# Patient Record
Sex: FEMALE | Race: BLACK OR AFRICAN AMERICAN | NOT HISPANIC OR LATINO | Employment: FULL TIME | URBAN - METROPOLITAN AREA
[De-identification: names, ages, dates, MRNs, and addresses within clinical notes are randomized per-mention and may not be internally consistent; named-entity substitution may affect disease eponyms.]

---

## 2017-09-25 ENCOUNTER — OFFICE VISIT (OUTPATIENT)
Dept: ENDOCRINOLOGY | Facility: CLINIC | Age: 37
End: 2017-09-25
Payer: COMMERCIAL

## 2017-09-25 ENCOUNTER — LAB VISIT (OUTPATIENT)
Dept: LAB | Facility: HOSPITAL | Age: 37
End: 2017-09-25
Attending: INTERNAL MEDICINE
Payer: COMMERCIAL

## 2017-09-25 VITALS
SYSTOLIC BLOOD PRESSURE: 130 MMHG | HEIGHT: 67 IN | TEMPERATURE: 97 F | WEIGHT: 212.5 LBS | DIASTOLIC BLOOD PRESSURE: 80 MMHG | HEART RATE: 76 BPM | BODY MASS INDEX: 33.35 KG/M2

## 2017-09-25 DIAGNOSIS — E05.90 HYPERTHYROIDISM: Primary | ICD-10-CM

## 2017-09-25 DIAGNOSIS — E04.2 MULTIPLE THYROID NODULES: ICD-10-CM

## 2017-09-25 DIAGNOSIS — E88.819 INSULIN RESISTANCE: ICD-10-CM

## 2017-09-25 DIAGNOSIS — E05.90 HYPERTHYROIDISM: ICD-10-CM

## 2017-09-25 LAB — HCG INTACT+B SERPL-ACNC: <1.2 MIU/ML

## 2017-09-25 PROCEDURE — 3008F BODY MASS INDEX DOCD: CPT | Mod: S$GLB,,, | Performed by: INTERNAL MEDICINE

## 2017-09-25 PROCEDURE — 36415 COLL VENOUS BLD VENIPUNCTURE: CPT | Mod: PO

## 2017-09-25 PROCEDURE — 99999 PR PBB SHADOW E&M-NEW PATIENT-LVL III: CPT | Mod: PBBFAC,,, | Performed by: INTERNAL MEDICINE

## 2017-09-25 PROCEDURE — 99214 OFFICE O/P EST MOD 30 MIN: CPT | Mod: S$GLB,,, | Performed by: INTERNAL MEDICINE

## 2017-09-25 PROCEDURE — 84702 CHORIONIC GONADOTROPIN TEST: CPT

## 2017-09-25 RX ORDER — METFORMIN HYDROCHLORIDE 500 MG/1
TABLET, EXTENDED RELEASE ORAL
Qty: 60 TABLET | Refills: 3 | Status: SHIPPED | OUTPATIENT
Start: 2017-09-25 | End: 2023-08-28

## 2017-09-25 RX ORDER — HYDROCHLOROTHIAZIDE 12.5 MG/1
12.5 CAPSULE ORAL DAILY
Refills: 5 | COMMUNITY
Start: 2017-08-12

## 2017-09-25 RX ORDER — AMLODIPINE BESYLATE 5 MG/1
5 TABLET ORAL
COMMUNITY
Start: 2015-09-28

## 2017-09-25 RX ORDER — NYSTATIN 100000 [USP'U]/G
POWDER TOPICAL 2 TIMES DAILY
Refills: 5 | COMMUNITY
Start: 2017-08-10 | End: 2023-08-28

## 2017-09-25 NOTE — PROGRESS NOTES
""This note will be shared with the patient" Patient ID: Nathaly Sena is a 37 y.o. female.  Patient is here for follow up-last seen 11/2015        Chief Complaint: Hyperthyroidism      HPI  Nathaly Sena is a 35 y.o. female who  Was originally referred by her PCP Dr. Demetria Nunez for history of subclinical hyperthyroidism in 2011. She was referred for having had a suppressed TSH with normal T4 and T3 levels and she had an uptake and scan at that time that suggested a warm nodule on the left side. Her thyroid ultrasound showed a possible correlating thyroid nodule on the left side. Her thyroid ultrasound showed a 11 mm nodule in the mid to lower portion of the left lobe. She had a smaller 8 mm nodule in the lower pole as well. Because she was asymptomatic and had mild hyperthyroidism with smaller nodules and her young age I wanted to have her follow-up and if it persisted the plan was going to treat with radioactive iodine however the patient apparently did not follow-up again until 2015 and again she had suppressed TSH, I repeated thyroid ultrasound and this time the 2 nodules were subcentimeter but thyroid uptake and scan again suggested a hot nodule , I had planned treatment with radioactive iodine but due to cost patient was not able to get treatment and she did not follow-up with anyone until recently and she saw her gynecologist with complaints of fatigue     She showed me results of labs on her phone and her TSH is again suppressed at 0.187 with a normal free T4.  She also had insulin resistance revealed on glucose tolerance test as her insulin levels went up high.  The only blood sugar elevation was in 1 hour the blood sugar was 165     She admits she does not follow particularly healthy diet    there is no family history of thyroid disease or thyroid cancer and no exposure to radiation        I have reviewed the past medical, family and social history    Review of Systems   Constitutional: Positive for " fatigue. Negative for appetite change, fever and unexpected weight change.   HENT: Negative for sore throat and trouble swallowing.    Eyes: Negative for visual disturbance.   Respiratory: Negative for shortness of breath and wheezing.    Cardiovascular: Positive for palpitations. Negative for chest pain and leg swelling.   Gastrointestinal: Negative for diarrhea, nausea and vomiting.   Endocrine: Negative for cold intolerance, heat intolerance, polydipsia, polyphagia and polyuria.        Feeling hot flashes   Genitourinary: Negative for difficulty urinating, dysuria and menstrual problem.   Musculoskeletal: Negative for arthralgias and joint swelling.   Skin: Negative for rash.   Neurological: Negative for dizziness, weakness, numbness and headaches.   Psychiatric/Behavioral: Positive for sleep disturbance. Negative for confusion and dysphoric mood. The patient is nervous/anxious.        Objective:      Physical Exam   Constitutional: She is oriented to person, place, and time. She appears well-developed and well-nourished. No distress.   HENT:   Head: Normocephalic and atraumatic.   Right Ear: External ear normal.   Left Ear: External ear normal.   Nose: Nose normal.   Mouth/Throat: Oropharynx is clear and moist. No oropharyngeal exudate.   Eyes: Conjunctivae and EOM are normal. Pupils are equal, round, and reactive to light. No scleral icterus.   Neck: No JVD present. No tracheal deviation present. No thyromegaly present.   Cardiovascular: Normal rate, regular rhythm, normal heart sounds and intact distal pulses.  Exam reveals no gallop and no friction rub.    No murmur heard.  Pulmonary/Chest: Effort normal and breath sounds normal. No respiratory distress. She has no wheezes. She has no rales.   Abdominal: Soft. Bowel sounds are normal. She exhibits no distension and no mass. There is no tenderness. There is no rebound and no guarding. No hernia.   Musculoskeletal: She exhibits no edema or deformity.  "  Lymphadenopathy:     She has no cervical adenopathy.   Neurological: She is alert and oriented to person, place, and time. She has normal reflexes. No cranial nerve deficit.   Skin: Skin is warm. No rash noted. No erythema.   Psychiatric: She has a normal mood and affect. Her behavior is normal.   Vitals reviewed.        Lab Review:   As above  THYROID SCAN 6 and 24 hour radioiodine uptake    INDICATION:  Hyperthyroidism, toxic thyroid nodule    TECHNIQUE:  After administration of oral I-123,  6 and 24 hour uptake values were obtained of the thyroid gland followed by 24-hour images with a gamma camera using a pinhole collimator.    COMPARISON: Thyroid ultrasound 12/1/2015    FINDINGS:  Images demonstrate a small focus of increased radio iodine uptake in the lower pole of the left lobe of the thyroid gland. The 6 hour uptake is 13%.  The 24 hour radioiodine uptake is 28.1%.         IMPRESSION:  1. Normal 6- and 24-hour radioiodine uptake of 13% and 28%, respectively. (Normal range is 6- 18% after 6 hours and 10 to 35% at 24 hours.)    2. Small focus of increased uptake in the lower pole of the left lobe of the thyroid gland compatible with a hyperfunctioning nodule ("hot" nodule). This appears to correspond to a complex nodule seen on the recent thyroid ultrasound. However, it may be   related to an adjacent solid nodule.          Reference: Society of Nuclear Medicine Procedure Guideline for Thyroid Uptake Measurement.  Version 3.0, approved September 5, 2006.                                          Transcribed By:  BYRON  Transcribed Date/Time:  02-DEC-2015 09:01  Assessment:     1. Hyperthyroidism  HCG, QUANTITATIVE, PREGNANCY    Persistent long-standing hyperthyroidism due to hot nodule based on 2 previous thyroid uptake scan. Plan I-131 rx with 10 Micu   2. Multiple thyroid nodules  US Soft Tissue Head Neck Thyroid    Do follow-up thyroid ultrasound   3. Insulin resistance      Increase physical activity, reduce " carbohydrate intake and start metformin        I discussed a possible alternative treatment with methimazole but patient prefer radioactive iodine treatment    Radioactive iodine precautions explained to patient  Plan:   Hyperthyroidism  Comments:  Persistent long-standing hyperthyroidism due to hot nodule based on 2 previous thyroid uptake scan. Plan I-131 rx with 10 Micu  Orders:  -     HCG, QUANTITATIVE, PREGNANCY; Future; Expected date: 09/25/2017    Multiple thyroid nodules  Comments:  Do follow-up thyroid ultrasound  Orders:  -     US Soft Tissue Head Neck Thyroid; Future; Expected date: 09/25/2017    Insulin resistance  Comments:  Increase physical activity, reduce carbohydrate intake and start metformin    Other orders  -     metformin (GLUCOPHAGE-XR) 500 MG 24 hr tablet; Start with one tablet with dinner for one week;2nd week: Add 2nd tablet with dinner.  Dispense: 60 tablet; Refill: 3          Return in about 2 months (around 11/25/2017).    Labs prior to appointment? not applicable

## 2017-09-29 ENCOUNTER — TELEPHONE (OUTPATIENT)
Dept: RADIOLOGY | Facility: HOSPITAL | Age: 37
End: 2017-09-29

## 2017-10-02 ENCOUNTER — HOSPITAL ENCOUNTER (OUTPATIENT)
Dept: RADIOLOGY | Facility: HOSPITAL | Age: 37
Discharge: HOME | End: 2017-10-02
Attending: INTERNAL MEDICINE
Payer: COMMERCIAL

## 2017-10-02 DIAGNOSIS — E04.2 MULTIPLE THYROID NODULES: ICD-10-CM

## 2017-10-02 PROCEDURE — 76536 US EXAM OF HEAD AND NECK: CPT | Mod: TC,PO

## 2017-10-02 PROCEDURE — 76536 US EXAM OF HEAD AND NECK: CPT | Mod: 26,,, | Performed by: RADIOLOGY

## 2017-10-04 ENCOUNTER — TELEPHONE (OUTPATIENT)
Dept: ENDOCRINOLOGY | Facility: CLINIC | Age: 37
End: 2017-10-04

## 2018-04-02 NOTE — TELEPHONE ENCOUNTER
Pt advised that she was overdue for a MD visit. Pt scheduled an appt, and stated that she has enough medication to last until appt. Pt advised that no additional refills will be given until she is seen by Md. Pt verbalized understanding.

## 2018-05-01 ENCOUNTER — LAB VISIT (OUTPATIENT)
Dept: LAB | Facility: HOSPITAL | Age: 38
End: 2018-05-01
Attending: INTERNAL MEDICINE
Payer: COMMERCIAL

## 2018-05-01 ENCOUNTER — OFFICE VISIT (OUTPATIENT)
Dept: ENDOCRINOLOGY | Facility: CLINIC | Age: 38
End: 2018-05-01
Payer: COMMERCIAL

## 2018-05-01 VITALS
TEMPERATURE: 98 F | WEIGHT: 205.5 LBS | BODY MASS INDEX: 32.25 KG/M2 | DIASTOLIC BLOOD PRESSURE: 84 MMHG | HEART RATE: 71 BPM | SYSTOLIC BLOOD PRESSURE: 126 MMHG | HEIGHT: 67 IN

## 2018-05-01 DIAGNOSIS — E88.819 INSULIN RESISTANCE: ICD-10-CM

## 2018-05-01 DIAGNOSIS — E04.2 MULTIPLE THYROID NODULES: ICD-10-CM

## 2018-05-01 DIAGNOSIS — E05.90 HYPERTHYROIDISM: Primary | ICD-10-CM

## 2018-05-01 DIAGNOSIS — E05.90 HYPERTHYROIDISM: ICD-10-CM

## 2018-05-01 LAB
ALBUMIN SERPL BCP-MCNC: 4 G/DL
ALP SERPL-CCNC: 71 U/L
ALT SERPL W/O P-5'-P-CCNC: 31 U/L
ANION GAP SERPL CALC-SCNC: 9 MMOL/L
AST SERPL-CCNC: 24 U/L
BILIRUB SERPL-MCNC: 0.5 MG/DL
BUN SERPL-MCNC: 13 MG/DL
CALCIUM SERPL-MCNC: 9.7 MG/DL
CHLORIDE SERPL-SCNC: 107 MMOL/L
CO2 SERPL-SCNC: 27 MMOL/L
CREAT SERPL-MCNC: 0.7 MG/DL
EST. GFR  (AFRICAN AMERICAN): >60 ML/MIN/1.73 M^2
EST. GFR  (NON AFRICAN AMERICAN): >60 ML/MIN/1.73 M^2
ESTIMATED AVG GLUCOSE: 88 MG/DL
GLUCOSE SERPL-MCNC: 74 MG/DL
HBA1C MFR BLD HPLC: 4.7 %
INSULIN COLLECTION INTERVAL: NORMAL
INSULIN SERPL-ACNC: 12.8 UU/ML
POTASSIUM SERPL-SCNC: 4 MMOL/L
PROT SERPL-MCNC: 7.7 G/DL
SODIUM SERPL-SCNC: 143 MMOL/L
T3FREE SERPL-MCNC: 2.5 PG/ML
T4 FREE SERPL-MCNC: 0.81 NG/DL
THYROGLOB AB SERPL IA-ACNC: <4 IU/ML
THYROPEROXIDASE IGG SERPL-ACNC: <6 IU/ML
TSH SERPL DL<=0.005 MIU/L-ACNC: 0.41 UIU/ML

## 2018-05-01 PROCEDURE — 99214 OFFICE O/P EST MOD 30 MIN: CPT | Mod: S$GLB,,, | Performed by: INTERNAL MEDICINE

## 2018-05-01 PROCEDURE — 99999 PR PBB SHADOW E&M-EST. PATIENT-LVL III: CPT | Mod: PBBFAC,,, | Performed by: INTERNAL MEDICINE

## 2018-05-01 PROCEDURE — 84481 FREE ASSAY (FT-3): CPT

## 2018-05-01 PROCEDURE — 86376 MICROSOMAL ANTIBODY EACH: CPT

## 2018-05-01 PROCEDURE — 84432 ASSAY OF THYROGLOBULIN: CPT

## 2018-05-01 PROCEDURE — 84443 ASSAY THYROID STIM HORMONE: CPT

## 2018-05-01 PROCEDURE — 83036 HEMOGLOBIN GLYCOSYLATED A1C: CPT

## 2018-05-01 PROCEDURE — 84445 ASSAY OF TSI GLOBULIN: CPT

## 2018-05-01 PROCEDURE — 80053 COMPREHEN METABOLIC PANEL: CPT

## 2018-05-01 PROCEDURE — 83520 IMMUNOASSAY QUANT NOS NONAB: CPT

## 2018-05-01 PROCEDURE — 83525 ASSAY OF INSULIN: CPT

## 2018-05-01 PROCEDURE — 84439 ASSAY OF FREE THYROXINE: CPT

## 2018-05-01 PROCEDURE — 86800 THYROGLOBULIN ANTIBODY: CPT | Mod: 91

## 2018-05-01 NOTE — PROGRESS NOTES
Patient ID: Nathaly Sena is a 38 y.o. female.  Patient is here for follow up        Chief Complaint: Hyperthyroidism      HPI    Nathaly Sena is a 35 y.o. female who  Was originally referred by her PCP Dr. Demetria Nunez for history of subclinical hyperthyroidism in 2011. She was referred for having had a suppressed TSH with normal T4 and T3 levels and she had an uptake and scan at that time that suggested a warm nodule on the left side. Her thyroid ultrasound showed a possible correlating thyroid nodule on the left side. Her thyroid ultrasound showed a 11 mm nodule in the mid to lower portion of the left lobe. She had a smaller 8 mm nodule in the lower pole as well. Because she was asymptomatic and had mild hyperthyroidism with smaller nodules and her young age I wanted to have her follow-up and if it persisted the plan was going to treat with radioactive iodine however the patient apparently did not follow-up again until 2015 and again she had suppressed TSH, I repeated thyroid ultrasound and this time the 2 nodules were subcentimeter but thyroid uptake and scan again suggested a hot nodule , I had planned treatment with radioactive iodine but due to cost patient was not able to get treatment and she did not follow-up with anyone until 2017 when she saw her gynecologist with complaints of fatigue        At last visit in September 2017 she showed me results of labs on her phone and her TSH iwas again suppressed at 0.187 with a normal free T4.  She also had insulin resistance revealed on glucose tolerance test as her insulin levels went up high.  The only blood sugar elevation was in 1 hour and  the blood sugar was 165    there is no family history of thyroid disease or thyroid cancer and no exposure to radiation       I therefore recommended treatment with radioactive iodine and she had treatment September 28, 2017 with 10.1 mCi I-131 at [facility] and she was supposed to have followed  up 4-6 weeks following treatment but has not followed up until now-she reports she had no side effects from the treatment      Still with fatigue and sweats and hot flashes and last menstrual cycle 6-7 years ago due to endometrial ablation, no palpitaions      Had oj and hasbrowns for breakfast this am  I have reviewed the past medical, family and social history    Review of Systems   Constitutional: Positive for fatigue. Negative for appetite change, fever and unexpected weight change.   HENT: Negative for sore throat and trouble swallowing.    Eyes: Negative for visual disturbance.   Respiratory: Negative for shortness of breath and wheezing.    Cardiovascular: Negative for chest pain, palpitations and leg swelling.   Gastrointestinal: Negative for diarrhea, nausea and vomiting.   Endocrine: Negative for cold intolerance, heat intolerance, polydipsia, polyphagia and polyuria.   Genitourinary: Negative for difficulty urinating, dysuria and menstrual problem.   Musculoskeletal: Negative for arthralgias and joint swelling.   Skin: Negative for rash.   Neurological: Negative for dizziness, weakness, numbness and headaches.   Psychiatric/Behavioral: Negative for confusion, dysphoric mood and sleep disturbance.       Objective:      Physical Exam   Constitutional: She appears well-developed and well-nourished. No distress.   HENT:   Head: Normocephalic and atraumatic.   Eyes: Conjunctivae are normal.   Neck: No JVD present. No tracheal deviation present. No thyromegaly present.   Cardiovascular: Normal rate, regular rhythm, normal heart sounds and intact distal pulses.  Exam reveals no gallop and no friction rub.    No murmur heard.  Pulmonary/Chest: Effort normal and breath sounds normal. No stridor. No respiratory distress. She has no wheezes. She has no rales. She exhibits no tenderness.   Musculoskeletal: She exhibits no edema or deformity.   Lymphadenopathy:     She has no cervical adenopathy.   Neurological: She  is alert.   Skin: She is not diaphoretic.   Vitals reviewed.        Lab Review:   No visits with results within 6 Month(s) from this visit.   Latest known visit with results is:   Lab Visit on 09/25/2017   Component Date Value    hCG Quant 09/25/2017 <1.2        Assessment:     1. Hyperthyroidism  TSH    T4, free    T3, free    Thyroid peroxidase antibody    Anti-thyroglobulin antibody    Thyroglobulin    Thyrotropin receptor antibody    Thyroid stimulating immunoglobulin    Status post radioactive iodine for toxic nodule, check thyroid function tests   2. Multiple thyroid nodules  US Soft Tissue Head Neck Thyroid    Check thyroid ultrasound to follow up on nodules   3. Insulin resistance  Hemoglobin A1c    Comprehensive metabolic panel    INSULIN, RANDOM    Continue metformin and continue healthy lifestyle      Plan:   Hyperthyroidism  Comments:  Status post radioactive iodine for toxic nodule, check thyroid function tests  Orders:  -     TSH; Future; Expected date: 05/01/2018  -     T4, free; Future; Expected date: 05/01/2018  -     T3, free; Future; Expected date: 05/01/2018  -     Thyroid peroxidase antibody; Future; Expected date: 05/01/2018  -     Anti-thyroglobulin antibody; Future; Expected date: 05/01/2018  -     Thyroglobulin; Future; Expected date: 05/01/2018  -     Thyrotropin receptor antibody; Future; Expected date: 05/01/2018  -     Thyroid stimulating immunoglobulin; Future; Expected date: 05/01/2018    Multiple thyroid nodules  Comments:  Check thyroid ultrasound to follow up on nodules  Orders:  -     US Soft Tissue Head Neck Thyroid; Future; Expected date: 05/01/2018    Insulin resistance  Comments:  Continue metformin and continue healthy lifestyle  Orders:  -     Hemoglobin A1c; Future; Expected date: 05/01/2018  -     Comprehensive metabolic panel; Future; Expected date: 05/01/2018  -     INSULIN, RANDOM; Future; Expected date: 05/01/2018          Follow-up in about 6 months (around  11/1/2018).    Labs prior to appointment? not applicable     Disclaimer:  This note may have been partially prepared using voice recognition software and  it may have not been extensively proofed, as such there could be errors within the text such as sound alike errors.

## 2018-05-03 LAB
THRYOGLOBULIN INTERPRETATION: ABNORMAL
THYROGLOB AB SERPL-ACNC: <1.8 IU/ML
THYROGLOB SERPL-MCNC: 25 NG/ML
TSH RECEP AB SER-ACNC: <1 IU/L
TSI SER-ACNC: <0.1 IU/L

## 2018-05-11 ENCOUNTER — HOSPITAL ENCOUNTER (OUTPATIENT)
Dept: RADIOLOGY | Facility: HOSPITAL | Age: 38
Discharge: HOME | End: 2018-05-11
Attending: INTERNAL MEDICINE
Payer: COMMERCIAL

## 2018-05-11 DIAGNOSIS — E04.2 MULTIPLE THYROID NODULES: ICD-10-CM

## 2018-06-18 ENCOUNTER — TELEPHONE (OUTPATIENT)
Dept: ENDOCRINOLOGY | Facility: CLINIC | Age: 38
End: 2018-06-18

## 2018-06-18 NOTE — TELEPHONE ENCOUNTER
Pt advised the following, per MD orders all of her recent labs were fine. Pt verbalized understanding.

## 2023-08-08 ENCOUNTER — TELEPHONE (OUTPATIENT)
Dept: FAMILY MEDICINE | Facility: CLINIC | Age: 43
End: 2023-08-08
Payer: COMMERCIAL

## 2023-08-08 NOTE — TELEPHONE ENCOUNTER
----- Message from Brooke Maddox MA sent at 8/8/2023  9:49 AM CDT -----  Contact: Nathaly @ [phone]  The patient calling to get scheduled for a appointment to establish care. No appointments pulling up for the provider. Please give her a call back at [phone].

## 2023-08-28 ENCOUNTER — OFFICE VISIT (OUTPATIENT)
Dept: INTERNAL MEDICINE | Facility: CLINIC | Age: 43
End: 2023-08-28
Payer: COMMERCIAL

## 2023-08-28 ENCOUNTER — LAB VISIT (OUTPATIENT)
Dept: LAB | Facility: HOSPITAL | Age: 43
End: 2023-08-28
Attending: FAMILY MEDICINE
Payer: COMMERCIAL

## 2023-08-28 VITALS
WEIGHT: 211.44 LBS | DIASTOLIC BLOOD PRESSURE: 88 MMHG | OXYGEN SATURATION: 98 % | HEIGHT: 67 IN | TEMPERATURE: 97 F | SYSTOLIC BLOOD PRESSURE: 118 MMHG | HEART RATE: 89 BPM | BODY MASS INDEX: 33.19 KG/M2

## 2023-08-28 DIAGNOSIS — I10 ESSENTIAL HYPERTENSION: Primary | ICD-10-CM

## 2023-08-28 DIAGNOSIS — E03.9 HYPOTHYROIDISM, UNSPECIFIED TYPE: ICD-10-CM

## 2023-08-28 DIAGNOSIS — R07.89 CHEST DISCOMFORT: ICD-10-CM

## 2023-08-28 DIAGNOSIS — I10 ESSENTIAL HYPERTENSION: ICD-10-CM

## 2023-08-28 DIAGNOSIS — E88.819 INSULIN RESISTANCE: ICD-10-CM

## 2023-08-28 LAB
ALBUMIN SERPL BCP-MCNC: 4.1 G/DL (ref 3.5–5.2)
ALP SERPL-CCNC: 55 U/L (ref 55–135)
ALT SERPL W/O P-5'-P-CCNC: 34 U/L (ref 10–44)
ANION GAP SERPL CALC-SCNC: 13 MMOL/L (ref 8–16)
AST SERPL-CCNC: 27 U/L (ref 10–40)
BASOPHILS # BLD AUTO: 0.02 K/UL (ref 0–0.2)
BASOPHILS NFR BLD: 0.5 % (ref 0–1.9)
BILIRUB SERPL-MCNC: 0.7 MG/DL (ref 0.1–1)
BUN SERPL-MCNC: 14 MG/DL (ref 6–20)
CALCIUM SERPL-MCNC: 10.1 MG/DL (ref 8.7–10.5)
CHLORIDE SERPL-SCNC: 102 MMOL/L (ref 95–110)
CHOLEST SERPL-MCNC: 247 MG/DL (ref 120–199)
CHOLEST/HDLC SERPL: 5.9 {RATIO} (ref 2–5)
CO2 SERPL-SCNC: 26 MMOL/L (ref 23–29)
CREAT SERPL-MCNC: 0.8 MG/DL (ref 0.5–1.4)
DIFFERENTIAL METHOD: NORMAL
EOSINOPHIL # BLD AUTO: 0.1 K/UL (ref 0–0.5)
EOSINOPHIL NFR BLD: 1.2 % (ref 0–8)
ERYTHROCYTE [DISTWIDTH] IN BLOOD BY AUTOMATED COUNT: 13.4 % (ref 11.5–14.5)
EST. GFR  (NO RACE VARIABLE): >60 ML/MIN/1.73 M^2
ESTIMATED AVG GLUCOSE: 100 MG/DL (ref 68–131)
GLUCOSE SERPL-MCNC: 94 MG/DL (ref 70–110)
HBA1C MFR BLD: 5.1 % (ref 4–5.6)
HCT VFR BLD AUTO: 41 % (ref 37–48.5)
HDLC SERPL-MCNC: 42 MG/DL (ref 40–75)
HDLC SERPL: 17 % (ref 20–50)
HGB BLD-MCNC: 13.6 G/DL (ref 12–16)
IMM GRANULOCYTES # BLD AUTO: 0 K/UL (ref 0–0.04)
IMM GRANULOCYTES NFR BLD AUTO: 0 % (ref 0–0.5)
INSULIN COLLECTION INTERVAL: ABNORMAL
INSULIN SERPL-ACNC: 47.7 UU/ML
LDLC SERPL CALC-MCNC: 162.4 MG/DL (ref 63–159)
LYMPHOCYTES # BLD AUTO: 1.4 K/UL (ref 1–4.8)
LYMPHOCYTES NFR BLD: 32.9 % (ref 18–48)
MCH RBC QN AUTO: 28.9 PG (ref 27–31)
MCHC RBC AUTO-ENTMCNC: 33.2 G/DL (ref 32–36)
MCV RBC AUTO: 87 FL (ref 82–98)
MONOCYTES # BLD AUTO: 0.4 K/UL (ref 0.3–1)
MONOCYTES NFR BLD: 10.2 % (ref 4–15)
NEUTROPHILS # BLD AUTO: 2.3 K/UL (ref 1.8–7.7)
NEUTROPHILS NFR BLD: 55.2 % (ref 38–73)
NONHDLC SERPL-MCNC: 205 MG/DL
NRBC BLD-RTO: 0 /100 WBC
PLATELET # BLD AUTO: 215 K/UL (ref 150–450)
PMV BLD AUTO: 11.9 FL (ref 9.2–12.9)
POTASSIUM SERPL-SCNC: 3.6 MMOL/L (ref 3.5–5.1)
PROT SERPL-MCNC: 7.4 G/DL (ref 6–8.4)
RBC # BLD AUTO: 4.71 M/UL (ref 4–5.4)
SODIUM SERPL-SCNC: 141 MMOL/L (ref 136–145)
T3 SERPL-MCNC: 86 NG/DL (ref 60–180)
T4 FREE SERPL-MCNC: 0.83 NG/DL (ref 0.71–1.51)
TRIGL SERPL-MCNC: 213 MG/DL (ref 30–150)
TSH SERPL DL<=0.005 MIU/L-ACNC: 1.1 UIU/ML (ref 0.4–4)
WBC # BLD AUTO: 4.23 K/UL (ref 3.9–12.7)

## 2023-08-28 PROCEDURE — 3079F PR MOST RECENT DIASTOLIC BLOOD PRESSURE 80-89 MM HG: ICD-10-PCS | Mod: CPTII,S$GLB,, | Performed by: FAMILY MEDICINE

## 2023-08-28 PROCEDURE — 1159F MED LIST DOCD IN RCRD: CPT | Mod: CPTII,S$GLB,, | Performed by: FAMILY MEDICINE

## 2023-08-28 PROCEDURE — 99999 PR PBB SHADOW E&M-EST. PATIENT-LVL IV: ICD-10-PCS | Mod: PBBFAC,,, | Performed by: FAMILY MEDICINE

## 2023-08-28 PROCEDURE — 83525 ASSAY OF INSULIN: CPT | Performed by: FAMILY MEDICINE

## 2023-08-28 PROCEDURE — 99204 PR OFFICE/OUTPT VISIT, NEW, LEVL IV, 45-59 MIN: ICD-10-PCS | Mod: S$GLB,,, | Performed by: FAMILY MEDICINE

## 2023-08-28 PROCEDURE — 99204 OFFICE O/P NEW MOD 45 MIN: CPT | Mod: S$GLB,,, | Performed by: FAMILY MEDICINE

## 2023-08-28 PROCEDURE — 3044F PR MOST RECENT HEMOGLOBIN A1C LEVEL <7.0%: ICD-10-PCS | Mod: CPTII,S$GLB,, | Performed by: FAMILY MEDICINE

## 2023-08-28 PROCEDURE — 3008F BODY MASS INDEX DOCD: CPT | Mod: CPTII,S$GLB,, | Performed by: FAMILY MEDICINE

## 2023-08-28 PROCEDURE — 36415 COLL VENOUS BLD VENIPUNCTURE: CPT | Performed by: FAMILY MEDICINE

## 2023-08-28 PROCEDURE — 1159F PR MEDICATION LIST DOCUMENTED IN MEDICAL RECORD: ICD-10-PCS | Mod: CPTII,S$GLB,, | Performed by: FAMILY MEDICINE

## 2023-08-28 PROCEDURE — 3079F DIAST BP 80-89 MM HG: CPT | Mod: CPTII,S$GLB,, | Performed by: FAMILY MEDICINE

## 2023-08-28 PROCEDURE — 3008F PR BODY MASS INDEX (BMI) DOCUMENTED: ICD-10-PCS | Mod: CPTII,S$GLB,, | Performed by: FAMILY MEDICINE

## 2023-08-28 PROCEDURE — 99999 PR PBB SHADOW E&M-EST. PATIENT-LVL IV: CPT | Mod: PBBFAC,,, | Performed by: FAMILY MEDICINE

## 2023-08-28 PROCEDURE — 85025 COMPLETE CBC W/AUTO DIFF WBC: CPT | Performed by: FAMILY MEDICINE

## 2023-08-28 PROCEDURE — 84439 ASSAY OF FREE THYROXINE: CPT | Performed by: FAMILY MEDICINE

## 2023-08-28 PROCEDURE — 84443 ASSAY THYROID STIM HORMONE: CPT | Performed by: FAMILY MEDICINE

## 2023-08-28 PROCEDURE — 3074F PR MOST RECENT SYSTOLIC BLOOD PRESSURE < 130 MM HG: ICD-10-PCS | Mod: CPTII,S$GLB,, | Performed by: FAMILY MEDICINE

## 2023-08-28 PROCEDURE — 80061 LIPID PANEL: CPT | Performed by: FAMILY MEDICINE

## 2023-08-28 PROCEDURE — 80053 COMPREHEN METABOLIC PANEL: CPT | Performed by: FAMILY MEDICINE

## 2023-08-28 PROCEDURE — 84480 ASSAY TRIIODOTHYRONINE (T3): CPT | Performed by: FAMILY MEDICINE

## 2023-08-28 PROCEDURE — 3074F SYST BP LT 130 MM HG: CPT | Mod: CPTII,S$GLB,, | Performed by: FAMILY MEDICINE

## 2023-08-28 PROCEDURE — 83036 HEMOGLOBIN GLYCOSYLATED A1C: CPT | Performed by: FAMILY MEDICINE

## 2023-08-28 PROCEDURE — 3044F HG A1C LEVEL LT 7.0%: CPT | Mod: CPTII,S$GLB,, | Performed by: FAMILY MEDICINE

## 2023-08-28 NOTE — PROGRESS NOTES
Subjective:      Patient ID: Nathaly Sena is a 43 y.o. female.    Chief Complaint: Establish Care    Had been following with dr. Montaño at [facility].   Nathaly is requesting that I assume the role of their primary care provider.    HTN--on amlodipine and HTCZ; doesn't check at home; has been on the medications for over 10 years     Had been on metformin from her endocrinologist.  She thinks it was for her  thyroid    The patient's Health Maintenance was reviewed and the following appears to be due at this time:   Health Maintenance Due   Topic Date Due    Hepatitis C Screening  Never done    Cervical Cancer Screening  Never done    HIV Screening  Never done    TETANUS VACCINE  Never done    Mammogram  Never done    COVID-19 Vaccine (3 - Pfizer series) 2021        Past Medical History:  Past Medical History:   Diagnosis Date    Goiter     Hypertension     Hypothyroidism     Obesity      Past Surgical History:   Procedure Laterality Date     SECTION      ENDOMETRIAL ABLATION       Review of patient's allergies indicates:   Allergen Reactions    Pcn [penicillins]      Social History     Socioeconomic History    Marital status:    Tobacco Use    Smoking status: Never    Smokeless tobacco: Never   Substance and Sexual Activity    Alcohol use: Yes     Comment: socially    Drug use: No    Sexual activity: Yes     Partners: Male     Birth control/protection: None   Social History Narrative     with three children.     Family History   Problem Relation Age of Onset    Hypertension Mother     Hypertension Father     Coronary artery disease Father         bypass surgery    Cancer Sister         breast    Cancer Paternal Grandmother         breast       Review of Systems   Constitutional:  Negative for fever.   Respiratory:  Negative for cough and shortness of breath.    Cardiovascular:  Positive for chest pain (sometimes has a heaviness with labored breathing; thinks related to anxiety; but  "sometimes happens with exertion).   Gastrointestinal:  Positive for constipation. Negative for diarrhea, nausea, vomiting and reflux.   Genitourinary:  Negative for hot flashes.   Psychiatric/Behavioral:  Negative for dysphoric mood and sleep disturbance. The patient is not nervous/anxious.         Objective:   /88 (BP Location: Right arm, Patient Position: Sitting, BP Method: Large (Manual))   Pulse 89   Temp 97.2 °F (36.2 °C) (Tympanic)   Ht 5' 7" (1.702 m)   Wt 95.9 kg (211 lb 6.7 oz)   SpO2 98%   BMI 33.11 kg/m²     Physical Exam  Vitals and nursing note reviewed.   Constitutional:       Appearance: Normal appearance.   HENT:      Head: Normocephalic.   Eyes:      Conjunctiva/sclera: Conjunctivae normal.   Neck:      Comments: No thyromegaly or palpable thyroid nodules  Cardiovascular:      Rate and Rhythm: Normal rate and regular rhythm.   Pulmonary:      Effort: Pulmonary effort is normal. No respiratory distress.      Breath sounds: Normal breath sounds.   Skin:     General: Skin is warm and dry.   Neurological:      Mental Status: She is alert and oriented to person, place, and time. Mental status is at baseline.   Psychiatric:         Mood and Affect: Mood normal.         Behavior: Behavior normal.         Thought Content: Thought content normal.       Assessment:     1. Essential hypertension    2. Hypothyroidism, unspecified type    3. Chest discomfort    4. Insulin resistance      Plan:       1. Essential hypertension  Comments:  Controlled, we will continue current regimen  Orders:  -     CBC Auto Differential; Future; Expected date: 08/28/2023  -     Comprehensive Metabolic Panel; Future; Expected date: 08/28/2023  -     Lipid Panel; Future; Expected date: 08/28/2023    2. Hypothyroidism, unspecified type  Comments:  Checking lab results  Orders:  -     TSH; Future; Expected date: 08/28/2023  -     T4, Free; Future; Expected date: 08/28/2023  -     T3; Future; Expected date: " 08/28/2023    3. Chest discomfort  Comments:  Checking labs and we will follow up with results, provided ER precautions    4. Insulin resistance  Comments:  We will follow up the results  Orders:  -     Hemoglobin A1C; Future; Expected date: 08/28/2023  -     Insulin, random; Future; Expected date: 08/28/2023         Medication List with Changes/Refills   Current Medications    AMLODIPINE (NORVASC) 5 MG TABLET    Take 5 mg by mouth.    HYDROCHLOROTHIAZIDE (MICROZIDE) 12.5 MG CAPSULE    Take 12.5 mg by mouth once daily.   Discontinued Medications    METFORMIN (GLUCOPHAGE-XR) 500 MG 24 HR TABLET    Start with one tablet with dinner for one week;2nd week: Add 2nd tablet with dinner.    NYSTOP POWDER    2 (two) times daily. apply to affected area                PATIENT INSTRUCTIONS:   Patient Instructions   Let us know if you would like us to place a referral for you to have a mammogram done or to establish with the breast specialist.  I highly recommend that you do get screening done.      Follow up in about 3 months (around 11/28/2023) for Wellness .

## 2023-08-28 NOTE — PATIENT INSTRUCTIONS
Let us know if you would like us to place a referral for you to have a mammogram done or to establish with the breast specialist.  I highly recommend that you do get screening done.

## 2023-09-05 ENCOUNTER — PATIENT OUTREACH (OUTPATIENT)
Dept: ADMINISTRATIVE | Facility: HOSPITAL | Age: 43
End: 2023-09-05
Payer: COMMERCIAL

## 2023-09-06 ENCOUNTER — PATIENT MESSAGE (OUTPATIENT)
Dept: INTERNAL MEDICINE | Facility: CLINIC | Age: 43
End: 2023-09-06
Payer: COMMERCIAL

## 2023-09-06 DIAGNOSIS — E88.819 INSULIN RESISTANCE: Primary | ICD-10-CM

## 2023-09-11 ENCOUNTER — PATIENT MESSAGE (OUTPATIENT)
Dept: ADMINISTRATIVE | Facility: HOSPITAL | Age: 43
End: 2023-09-11
Payer: COMMERCIAL

## 2023-09-12 RX ORDER — METFORMIN HYDROCHLORIDE 750 MG/1
750 TABLET, EXTENDED RELEASE ORAL
Qty: 90 TABLET | Refills: 1 | Status: SHIPPED | OUTPATIENT
Start: 2023-09-12 | End: 2024-03-10

## 2023-09-19 ENCOUNTER — PATIENT OUTREACH (OUTPATIENT)
Dept: ADMINISTRATIVE | Facility: HOSPITAL | Age: 43
End: 2023-09-19
Payer: COMMERCIAL

## 2023-09-19 DIAGNOSIS — Z12.31 SCREENING MAMMOGRAM FOR BREAST CANCER: Primary | ICD-10-CM
